# Patient Record
Sex: FEMALE | Race: ASIAN | NOT HISPANIC OR LATINO | URBAN - METROPOLITAN AREA
[De-identification: names, ages, dates, MRNs, and addresses within clinical notes are randomized per-mention and may not be internally consistent; named-entity substitution may affect disease eponyms.]

---
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
## 2023-06-21 NOTE — ED PEDIATRIC TRIAGE NOTE - AS TEMP SITE
Regarding: [place]- [place] dosage concerns  ----- Message from Rubin Chris sent at 6/20/2023  6:03 PM CDT -----  Patient Name: Daisy Taylor    Full Name of Provider seen for current symptoms: Goldberg, Marni H, MD    Symptoms: medication dosage concerns     Pregnant (females aged 13-60. If Yes, how long?): no     Call Back #: [phone]    Clinic Site / Call Center Account # for provider seen for current symptoms: [facility]    Which State are you currently located in? (enter State name in Summary field): [place]     Patients needing callback from the RN are informed of the following:   Please be aware the return phone call may come from an unidentified phone number or out of state phone number. Also keep in mind that call back times vary based on call volumes.  If your condition becomes life threatening while you wait for a callback, you should seek immediate medical assistance by calling 911 or going to the Emergency Department for evaluation.     oral